# Patient Record
Sex: MALE | Race: WHITE | NOT HISPANIC OR LATINO | Employment: OTHER | URBAN - METROPOLITAN AREA
[De-identification: names, ages, dates, MRNs, and addresses within clinical notes are randomized per-mention and may not be internally consistent; named-entity substitution may affect disease eponyms.]

---

## 2018-04-08 ENCOUNTER — HOSPITAL ENCOUNTER (EMERGENCY)
Facility: HOSPITAL | Age: 58
Discharge: HOME | End: 2018-04-08
Attending: EMERGENCY MEDICINE | Admitting: EMERGENCY MEDICINE

## 2018-04-08 VITALS
SYSTOLIC BLOOD PRESSURE: 132 MMHG | OXYGEN SATURATION: 97 % | HEART RATE: 100 BPM | BODY MASS INDEX: 20.32 KG/M2 | HEIGHT: 72 IN | DIASTOLIC BLOOD PRESSURE: 80 MMHG | WEIGHT: 150 LBS | TEMPERATURE: 98.2 F | RESPIRATION RATE: 18 BRPM

## 2018-04-08 DIAGNOSIS — Z20.89 MENINGITIS EXPOSURE: Primary | ICD-10-CM

## 2018-04-08 PROCEDURE — 99282 EMERGENCY DEPT VISIT SF MDM: CPT | Performed by: PHYSICIAN ASSISTANT

## 2018-04-08 PROCEDURE — 99283 EMERGENCY DEPT VISIT LOW MDM: CPT

## 2018-04-08 RX ORDER — CIPROFLOXACIN 500 MG/1
500 TABLET, FILM COATED ORAL ONCE
Status: COMPLETED | OUTPATIENT
Start: 2018-04-08 | End: 2018-04-08

## 2018-04-08 RX ADMIN — CIPROFLOXACIN 500 MG: 500 TABLET, FILM COATED ORAL at 14:44

## 2018-04-08 NOTE — ED PROVIDER NOTES
Subjective   History of Present Illness  History of Present Illness    Chief complaint: Exposure to meningitis    Location: Home    Quality/Severity:  No symptoms.  0/10    Timing/Duration: Last contact was yesterday with  body    Modifying Factors: None    Associated Symptoms: Denies fevers or chills.  Denies headache.  Denies photophobia.  Denies neck stiffness.      Narrative: 58-year-old male presents after being exposed to meningitis with his brother.  His brother passed away yesterday unexpectedly.  He found him in the home.  He was told by the  that he should come in for further evaluation as he suspected bacterial meningitis.  He states that he is been seeing his brother for approximately the past few weeks, one time per week.  He did not know that he was sick until he found his body yesterday.  Patient has been completely asymptomatic other than his chronic emphysema.  No changes in patient's baseline.    Review of Systems  General: Denies fevers or chills.  Denies any weakness or fatigue.  Denies any weight loss or weight gain.  SKIN: Denies any rashes lesions or ulcers.  Denies color change.  ENT: Denies sore throat or rhinorrhea.  Denies ear pain.    EYES: Denies any blurred vision.  Denies any change in vision.  Denies any photophobia.  Denies any vision loss.  LUNGS: Denies any shortness of breath or wheezing.  Denies any cough.  Denies any hemoptysis.  CARDIAC: Denies any chest pain.  Denies palpitations.  Denies syncope.  Denies any edema  ABD: Denies any abdominal pain.  Denies any nausea or vomiting or diarrhea.  Denies any rectal bleeding.  Denies constipation  : Denies any dysuria, urgency, frequency or hematuria.  Denies discharge.  Denies flank pain.  NEURO: Denies any focal weakness.  Denies headache.  Denies seizures.  Denies changes in speech or difficulty walking.  ENDOCRINE: Denies polydipsia and polyuria  M/S: Denies arthralgias, back pain, myalgias or neck  pain  HEME/LYMPH: Negative for adenopathy. Does not bruise/bleed easily.   PSYCH: Negative for suicidal ideas. Denies anxiety or depression  review was performed in addition to those in the above all other reviews are negative.      Past Medical History:   Diagnosis Date   • Emphysema/COPD        No Known Allergies    Past Surgical History:   Procedure Laterality Date   • WRIST FUSION         History reviewed. No pertinent family history.    Social History     Social History   • Marital status:      Social History Main Topics   • Smoking status: Current Every Day Smoker     Packs/day: 1.00     Types: Cigarettes   • Smokeless tobacco: Never Used   • Alcohol use No   • Drug use: No     Other Topics Concern   • Not on file     Current Facility-Administered Medications:   •  ciprofloxacin (CIPRO) tablet 500 mg, 500 mg, Oral, Once, Rula Diaz PA-C    Current Outpatient Prescriptions:   •  budesonide-formoterol (SYMBICORT) 160-4.5 MCG/ACT inhaler, Inhale 2 puffs 2 (two) times a day., Disp: , Rfl:           Objective   Physical Exam  Vitals:    04/08/18 1425   BP: 132/80   Pulse: 100   Resp: 18   Temp: 98.2 °F (36.8 °C)   SpO2: 97%     GENERAL: a/o x 4, NAD  SKIN: Warm pink and dry   HEENT:  PERRLA, EOM intact, conjunctiva normal, sclera clear  NECK: supple, negative Kernig's and Brudzinski's.  No rigidity.  LUNGS: Clear to auscultation bilaterally without wheezes, rales or rhonchi.  No accessory muscle use and no nasal flaring.  CARDIAC:  Regular rate and rhythm, S1-S2.  No murmurs, rubs or gallops.  No peripheral edema.  Equal pulses bilaterally.  ABDOMEN: Soft, nontender, nondistended.  No guarding or rebound tenderness.  Normal bowel sounds.  MUSCULOSKELETAL: Moves all extremities well.  No deformity.  NEURO: Cranial nerves II through XII grossly intact.  No gross focal deficits.  Alert.  Normal speech and motor.  PSYCH: Normal mood and affect      Procedures         ED Course  ED Course      Educated  patient.  Cipro ×1 here for prophylaxis.  Offered blood work, the patient declined.  Explained to patient that CT scan and lumbar puncture with the standards to test for meningitis, but patient is asymptomatic at this time.  He understands the symptoms that would cause him to need to come back to the ER.    D/w Dr. Virgen.          MDM  Number of Diagnoses or Management Options  Meningitis exposure: new and does not require workup     Amount and/or Complexity of Data Reviewed  Tests in the medicine section of CPT®: ordered and reviewed    Risk of Complications, Morbidity, and/or Mortality  Presenting problems: low  Diagnostic procedures: low  Management options: low    Patient Progress  Patient progress: improved      Final diagnoses:   Meningitis exposure       Dictated utilizing Dragon dictation       Rula Diaz PA-C  04/08/18 2277

## 2018-04-08 NOTE — DISCHARGE INSTRUCTIONS
Return to the emergency department with developing symptoms, headache, uncontrolled pain, inability to tolerate oral liquids, fever  or as needed with emergent concerns.

## 2019-11-26 ENCOUNTER — OFFICE VISIT (OUTPATIENT)
Dept: NEUROSURGERY | Facility: CLINIC | Age: 59
End: 2019-11-26

## 2019-11-26 VITALS
BODY MASS INDEX: 20.05 KG/M2 | WEIGHT: 148 LBS | HEIGHT: 72 IN | HEART RATE: 83 BPM | DIASTOLIC BLOOD PRESSURE: 63 MMHG | SYSTOLIC BLOOD PRESSURE: 106 MMHG

## 2019-11-26 DIAGNOSIS — G89.29 CHRONIC MIDLINE LOW BACK PAIN WITHOUT SCIATICA: ICD-10-CM

## 2019-11-26 DIAGNOSIS — M19.022: ICD-10-CM

## 2019-11-26 DIAGNOSIS — M54.50 CHRONIC MIDLINE LOW BACK PAIN WITHOUT SCIATICA: ICD-10-CM

## 2019-11-26 DIAGNOSIS — M54.2 NECK PAIN: Primary | ICD-10-CM

## 2019-11-26 DIAGNOSIS — M50.30 DEGENERATIVE DISC DISEASE, CERVICAL: ICD-10-CM

## 2019-11-26 DIAGNOSIS — M51.36 DDD (DEGENERATIVE DISC DISEASE), LUMBAR: ICD-10-CM

## 2019-11-26 PROBLEM — M02.022: Status: ACTIVE | Noted: 2019-11-26

## 2019-11-26 PROCEDURE — 99204 OFFICE O/P NEW MOD 45 MIN: CPT | Performed by: NEUROLOGICAL SURGERY

## 2019-11-26 RX ORDER — ALBUTEROL SULFATE 90 UG/1
AEROSOL, METERED RESPIRATORY (INHALATION)
Refills: 2 | COMMUNITY
Start: 2019-10-15

## 2019-11-26 RX ORDER — TIOTROPIUM BROMIDE 18 UG/1
1 CAPSULE ORAL; RESPIRATORY (INHALATION) DAILY
Refills: 5 | COMMUNITY
Start: 2019-10-15

## 2019-11-26 RX ORDER — ALBUTEROL SULFATE 90 UG/1
2 AEROSOL, METERED RESPIRATORY (INHALATION)
COMMUNITY

## 2019-11-26 NOTE — PROGRESS NOTES
Subjective   History of Present Illness: Cleveland Ortega is a 59 y.o. male is here today as a self referral for neck pain that radiates into bilateral arms and lower back pain with numbness in bilateral legs. Patient denies any conservative treatment.  He saw Dr. Engle and neurology who did an EMG/NCV study that he said it was more painful than anything he is never had done.  Describes neck and low back pain over long period of time but pretty much the last year. He was evaluated by neurology for numbness and tingling in the hands and feet.  He states inexplicably over the last couple of weeks he has had a left elbow swelling.  Denies bowel or bladder symptoms.  Shall try to refer him to pain management and physical therapy but he was not going to do any treatment until he saw a spine surgeon.    Neck Pain    The current episode started more than 1 month ago. The problem has been gradually worsening. The pain is present in the left side and right side. The quality of the pain is described as burning. The symptoms are aggravated by position (lifting). Associated symptoms include headaches, numbness (bilateral legs) and weakness. Pertinent negatives include no chest pain.   Back Pain   The current episode started more than 1 month ago. The problem occurs intermittently. The problem has been gradually worsening since onset. The pain is present in the lumbar spine. The quality of the pain is described as shooting and cramping. The pain radiates to the right thigh, left thigh, left knee, left foot, right foot and right knee. The symptoms are aggravated by position. Associated symptoms include headaches, numbness (bilateral legs) and weakness. Pertinent negatives include no bladder incontinence, bowel incontinence or chest pain. He has tried nothing for the symptoms.       The following portions of the patient's history were reviewed and updated as appropriate: allergies, current medications, past family history, past  "medical history, past social history, past surgical history and problem list.    Review of Systems   Eyes: Negative.    Respiratory: Negative.  Negative for chest tightness and shortness of breath.    Cardiovascular: Negative.  Negative for chest pain.   Gastrointestinal: Negative.  Negative for bowel incontinence.   Endocrine: Negative.    Genitourinary: Negative.  Negative for bladder incontinence.   Musculoskeletal: Positive for back pain and neck pain.   Skin: Negative.    Allergic/Immunologic: Negative.    Neurological: Positive for weakness, numbness (bilateral legs) and headaches.   Hematological: Negative.    Psychiatric/Behavioral: Negative.    All other systems reviewed and are negative.      Objective     Vitals:    11/26/19 1337   BP: 106/63   Pulse: 83   Weight: 67.1 kg (148 lb)   Height: 182.9 cm (72\")     Body mass index is 20.07 kg/m².      Physical Exam  Neurologic Exam    Physical Exam:    CONSTITUTIONAL: This 59 year old right handed  male appears well developed, well-nourished and in no acute distress.    HEAD & FACE: the head and face are symmetric, normocephalic and atraumatic.    EYES: Inspection of the conjunctivae and lids reveals no swelling, erythema or discharge.  Pupils are round, equal and reactive to light and there is no scleral icterus.    EARS, NOSE, MOUTH & THROAT: On inspection, the ears and nose are within normal limits.    NECK: the neck is supple and symmetric. The trachea is midline with no masses.    PULMONARY: Respiratory effort is normal with no increased work of breathing or signs of respiratory distress.    CARDIOVASCULAR: Pedal pulses are +2/4 bilaterally. Examination of the extremities shows no edema or varicosities.    LYMPHATIC: There is no palpable lymphadenopathy of the neck.    MUSCULOSKELETAL: Gait and station are within normal limits. The spine has normal alignment and range of motion, helpful abnormality of the neck or low back.  He has some swelling of " the left elbow.    SKIN: The skin is warm, dry and intact    NEUROLOGIC:   Cranial Nerves 2-12 intact  Normal motor strength noted. Muscle bulk and tone are normal.  Sensory exam is normal to all modalities.  Reflexes on the right side demonstrates 1/4 Triceps Reflex, 1/4 Biceps Reflex, 1/4 Brachioradialis Reflex, 0/4 Knee Jerk Reflex, 0/4 Ankle Jerk Reflex and no ankle clonus on the right.   Reflexes on the left side demonstrates 1/4 Triceps Reflex, 1/4 Biceps Reflex, 1/4 Brachioradialis Reflex, 0/4 Knee Jerk Reflex, 0/4 Ankle Jerk Reflex and no ankle clonus on the left.  Superficial/Primitive Reflexes: primitive reflexes were absent.  No Ricci's, Babinski or clonus.  No coordination deficit observed.  Radicular testing showed a negative Levon (KASEY) test and negative straight leg raise.  Spurling's maneuver is negative.  Cortical function is intact and without deficits. Speech is normal.    PSYCHIATRIC: oriented to person, place and time. Patient's mood and affect are normal.    Assessment/Plan   Independent Review of Radiographic Studies:     EMG/NCV study done at [facility] on August 27, 2019 reveals   Moderate chronic inactive left L4 less than L5 radiculopathy.  There is mild inactive right C5 radiculopathy.  Mild right and minimal left median neuropathy distal to the wrist.    I personally reviewed the images from the following studies.    MRI of the lumbar spine done September 26, 2019 at [facility] and open MRI reveals multilevel bar degenerative change but only mild narrowing of the neuroforamina of every lumbar vertebral level no severe central or neuroforaminal stenosis at any level.  No disc herniation and only slight offset of L2 on L3.    MRI of the cervical spine done September 26, 2019 at [facility] and open MRI reveals multilevel cervical degenerative change.  Do not see any central stenosis to speak of and there is no cord compression.  There is some facet degenerative change with at  the most mild to moderate neuroforaminal narrowing at any level.  Most significant levels are at C3-C4 with left moderate to severe neuroforaminal narrowing and to the right at C5-C6 with moderate to severe neuroforaminal narrowing.    Medical Decision Making:      His clinical exam does not reveal any specific myelopathy or radiculopathy.  The MRI does not yield any severe spinal stenosis or specific threat to the neurologic elements.  He does have degenerative change and facet arthropathy with multiple levels of neuroforaminal stenosis but nothing that explains the findings on the EMG/NCV study.  In fact my review of the MRI looks fairly typical for his age group without surgical pathology.    The pattern of symptoms I think he needs a medical rheumatology opinion because of the multiple joint complaints, swellings and pains that he has.  He may be a good candidate for interventional pain management but I think a rheumatologic investigation needs to transpire.  I do not identify surgical pathology to consider neurosurgical follow-up.    He may want to consult with his primary care physician to start the rheumatologic work-up as the formal consultation may take several months.    Cleveland was seen today for neck pain.    Diagnoses and all orders for this visit:    Neck pain  -     Ambulatory Referral to Rheumatology    Chronic midline low back pain without sciatica  -     Ambulatory Referral to Rheumatology    DDD (degenerative disc disease), lumbar  -     Ambulatory Referral to Rheumatology    Degenerative disc disease, cervical  -     Ambulatory Referral to Rheumatology    Arthropathy of left elbow  -     Ambulatory Referral to Rheumatology      Return if symptoms worsen or fail to improve.           Darwin Kong MD FACS FAANS  Neurological Surgery

## 2021-09-14 ENCOUNTER — HOSPITAL ENCOUNTER (OUTPATIENT)
Dept: GENERAL RADIOLOGY | Facility: HOSPITAL | Age: 61
Discharge: HOME | End: 2021-09-14
Admitting: FAMILY MEDICINE

## 2021-09-14 ENCOUNTER — TRANSCRIBE ORDERS (OUTPATIENT)
Dept: ADMINISTRATIVE | Facility: HOSPITAL | Age: 61
End: 2021-09-14

## 2021-09-14 DIAGNOSIS — R06.00 DYSPNEA, UNSPECIFIED TYPE: Primary | ICD-10-CM

## 2021-09-14 DIAGNOSIS — R06.00 DYSPNEA, UNSPECIFIED TYPE: ICD-10-CM

## 2021-09-14 PROCEDURE — 71046 X-RAY EXAM CHEST 2 VIEWS: CPT

## 2023-05-03 ENCOUNTER — TRANSCRIBE ORDERS (OUTPATIENT)
Dept: ADMINISTRATIVE | Facility: HOSPITAL | Age: 63
End: 2023-05-03
Payer: MEDICARE

## 2023-05-03 DIAGNOSIS — R10.11 RIGHT UPPER QUADRANT PAIN: Primary | ICD-10-CM

## 2023-05-23 ENCOUNTER — HOSPITAL ENCOUNTER (OUTPATIENT)
Dept: ULTRASOUND IMAGING | Facility: HOSPITAL | Age: 63
Discharge: HOME | End: 2023-05-23
Admitting: FAMILY MEDICINE
Payer: MEDICARE

## 2023-05-23 DIAGNOSIS — R10.11 RIGHT UPPER QUADRANT PAIN: ICD-10-CM

## 2023-05-23 PROCEDURE — 76705 ECHO EXAM OF ABDOMEN: CPT

## 2024-05-12 ENCOUNTER — APPOINTMENT (OUTPATIENT)
Dept: GENERAL RADIOLOGY | Facility: HOSPITAL | Age: 64
End: 2024-05-12
Payer: MEDICARE

## 2024-05-12 ENCOUNTER — HOSPITAL ENCOUNTER (EMERGENCY)
Facility: HOSPITAL | Age: 64
Discharge: HOME | End: 2024-05-12
Attending: EMERGENCY MEDICINE | Admitting: EMERGENCY MEDICINE
Payer: MEDICARE

## 2024-05-12 VITALS
OXYGEN SATURATION: 97 % | HEART RATE: 68 BPM | TEMPERATURE: 98.2 F | BODY MASS INDEX: 20.76 KG/M2 | SYSTOLIC BLOOD PRESSURE: 106 MMHG | DIASTOLIC BLOOD PRESSURE: 71 MMHG | RESPIRATION RATE: 18 BRPM | WEIGHT: 145 LBS | HEIGHT: 70 IN

## 2024-05-12 DIAGNOSIS — M54.50 ACUTE MIDLINE LOW BACK PAIN WITHOUT SCIATICA: Primary | ICD-10-CM

## 2024-05-12 LAB
BILIRUB UR QL STRIP: NEGATIVE
CLARITY UR: CLEAR
COLOR UR: YELLOW
GLUCOSE UR STRIP-MCNC: NEGATIVE MG/DL
HGB UR QL STRIP.AUTO: NEGATIVE
KETONES UR QL STRIP: NEGATIVE
LEUKOCYTE ESTERASE UR QL STRIP.AUTO: NEGATIVE
NITRITE UR QL STRIP: NEGATIVE
PH UR STRIP.AUTO: 5.5 [PH] (ref 4.5–8)
PROT UR QL STRIP: NEGATIVE
SP GR UR STRIP: 1.03 (ref 1–1.03)
UROBILINOGEN UR QL STRIP: NORMAL

## 2024-05-12 PROCEDURE — 81003 URINALYSIS AUTO W/O SCOPE: CPT | Performed by: EMERGENCY MEDICINE

## 2024-05-12 PROCEDURE — 63710000001 PREDNISONE PER 1 MG: Performed by: EMERGENCY MEDICINE

## 2024-05-12 PROCEDURE — 96372 THER/PROPH/DIAG INJ SC/IM: CPT

## 2024-05-12 PROCEDURE — 25010000002 KETOROLAC TROMETHAMINE PER 15 MG: Performed by: EMERGENCY MEDICINE

## 2024-05-12 PROCEDURE — 99283 EMERGENCY DEPT VISIT LOW MDM: CPT

## 2024-05-12 PROCEDURE — 72100 X-RAY EXAM L-S SPINE 2/3 VWS: CPT

## 2024-05-12 RX ORDER — KETOROLAC TROMETHAMINE 30 MG/ML
30 INJECTION, SOLUTION INTRAMUSCULAR; INTRAVENOUS ONCE
Status: COMPLETED | OUTPATIENT
Start: 2024-05-12 | End: 2024-05-12

## 2024-05-12 RX ORDER — CYCLOBENZAPRINE HCL 10 MG
10 TABLET ORAL ONCE
Status: COMPLETED | OUTPATIENT
Start: 2024-05-12 | End: 2024-05-12

## 2024-05-12 RX ORDER — KETOROLAC TROMETHAMINE 10 MG/1
10 TABLET, FILM COATED ORAL EVERY 6 HOURS PRN
Qty: 20 TABLET | Refills: 0 | Status: SHIPPED | OUTPATIENT
Start: 2024-05-12

## 2024-05-12 RX ORDER — CYCLOBENZAPRINE HCL 10 MG
10 TABLET ORAL 3 TIMES DAILY PRN
Qty: 30 TABLET | Refills: 0 | Status: SHIPPED | OUTPATIENT
Start: 2024-05-12

## 2024-05-12 RX ORDER — PREDNISONE 20 MG/1
60 TABLET ORAL ONCE
Status: COMPLETED | OUTPATIENT
Start: 2024-05-12 | End: 2024-05-12

## 2024-05-12 RX ORDER — METHYLPREDNISOLONE 4 MG/1
TABLET ORAL
Qty: 21 TABLET | Refills: 0 | Status: SHIPPED | OUTPATIENT
Start: 2024-05-12

## 2024-05-12 RX ADMIN — CYCLOBENZAPRINE 10 MG: 10 TABLET, FILM COATED ORAL at 15:53

## 2024-05-12 RX ADMIN — KETOROLAC TROMETHAMINE 30 MG: 30 INJECTION, SOLUTION INTRAMUSCULAR; INTRAVENOUS at 15:52

## 2024-05-12 RX ADMIN — PREDNISONE 60 MG: 20 TABLET ORAL at 16:06

## 2024-05-12 NOTE — ED PROVIDER NOTES
Subjective   History of Present Illness    Chief complaint: Back pain    Location: Low back    Quality/Severity: Sharp    Timing/Onset/Duration: Few days ago    Modifying Factors: Hurts to move    Associated Symptoms: No headache.  No fever chills or cough.  No sore throat earache or nasal congestion.  No chest pain shortness of breath.  No abdominal pain.  No diarrhea or burning when he urinates.  No change in bladder or bowel function.    Narrative: This 64-year-old white male presents with low back pain for the last few days.  Patient states he was shoveling some dirt a few days ago.  He denies any numbness or tingling in his leg but sometimes they hurt.  No nausea vomiting or dysuria.  Patient does have a history of kidney stones but the presentation today is not typical of his usual kidney stones.  The patient states that been taking ibuprofen without relief.  He took someone else's gabapentin and this did not help.  The patient has no history of back surgery.    PCP:Zan Romano MD      PCP:Zan Romano MD      Review of Systems   Constitutional:  Negative for chills and fever.   HENT:  Negative for congestion, ear pain and sore throat.    Respiratory:  Negative for cough and shortness of breath.    Cardiovascular:  Negative for chest pain.   Gastrointestinal:  Negative for abdominal pain, diarrhea, nausea and vomiting.   Genitourinary:  Negative for difficulty urinating and dysuria.   Musculoskeletal:  Positive for back pain.   Skin:  Negative for wound.   Neurological:  Negative for headaches.   Psychiatric/Behavioral:  Negative for confusion.          Past Medical History:   Diagnosis Date    Emphysema/COPD        No Known Allergies    Past Surgical History:   Procedure Laterality Date    WRIST FUSION         History reviewed. No pertinent family history.    Social History     Socioeconomic History    Marital status:    Tobacco Use    Smoking status: Every Day     Current packs/day: 1.00      Types: Cigarettes    Smokeless tobacco: Never   Substance and Sexual Activity    Alcohol use: No    Drug use: No           Objective   Physical Exam  Vitals (The temperature is 98.2 °F, pulse 76, respirations 18, /96, room air pulse ox 98%) and nursing note reviewed.   Constitutional:       Appearance: Normal appearance.   HENT:      Head: Normocephalic and atraumatic.      Right Ear: Tympanic membrane normal.      Left Ear: Tympanic membrane normal.   Neck:      Comments: There is no tenderness, deformity, or bony step-offs upon palpation of cervical, thoracic, lumbar sacrococcygeal spine.  The patient does localize his pain to the midline lumbar region.  Cardiovascular:      Rate and Rhythm: Normal rate and regular rhythm.      Pulses: Normal pulses.      Heart sounds: Normal heart sounds. No murmur heard.     No gallop.   Pulmonary:      Effort: Pulmonary effort is normal.      Breath sounds: Normal breath sounds.   Abdominal:      General: Abdomen is flat. Bowel sounds are normal. There is no distension.      Palpations: Abdomen is soft. There is no mass.      Tenderness: There is no abdominal tenderness. There is no right CVA tenderness, left CVA tenderness, guarding or rebound.      Hernia: No hernia is present.   Musculoskeletal:         General: No swelling, tenderness, deformity or signs of injury. Normal range of motion.      Cervical back: Normal range of motion and neck supple. No tenderness.      Right lower leg: No edema.      Left lower leg: No edema.   Skin:     General: Skin is warm and dry.      Capillary Refill: Capillary refill takes less than 2 seconds.      Findings: No rash.   Neurological:      General: No focal deficit present.      Mental Status: He is alert and oriented to person, place, and time.         Procedures           ED Course  ED Course as of 05/12/24 1531   Sun May 12, 2024   1528 Urinalysis is unremarkable. [RC]      ED Course User Index  [RC] Tay Muller MD       17:37 EDT, 05/12/24:  The patient was reassessed.  He feels much better.  His vital signs reviewed and are stable    17:37 EDT, 05/12/24:  The patient's diagnosis of arthritic low back pain was discussed with him.  Patient will be placed on a Medrol Dosepak and a prescription for Flexeril.  The patient will be given a prescription for ketorolac.  The patient should follow-up with Dr. Romano in 1 week.  He should rest.  He should apply ice to his back for 20 minutes every 2-3 hours while awake for 2 to 3 days.  The patient should then apply moist heat for 20 minutes every 2 to 3 days while awake.  The patient should return to the emergency department if there is increased pain, numbness, tingling, weakness, change in bladder or bowel function, worse in any way at all.  All the patient's questions were answered he will be discharged in good condition                                       Medical Decision Making  Amount and/or Complexity of Data Reviewed  Radiology: ordered.    Risk  Prescription drug management.        Final diagnoses:   Acute midline low back pain without sciatica       ED Disposition  ED Disposition       None            No follow-up provider specified.       Medication List      No changes were made to your prescriptions during this visit.            Tay Muller MD  05/12/24 6748

## 2024-05-12 NOTE — DISCHARGE INSTRUCTIONS
Take the ketorolac as needed as directed for pain.  Take the Medrol Dosepak as prescribed.  Take Flexeril as needed as directed for pain.  Ice to low back for 20 minutes every 2-3 hours while awake for 2 to 3 days, then apply moist heat for 20 minutes every 2-3 hours while awake for 2 to 3 days.  Rest.  Follow-up with Dr. Saqib rodriguez and 1 week.  Return to the emergency department if there is increased pain, numbness, tingling, weakness, change in bladder or bowel function, worse in any way at all.

## 2024-08-28 ENCOUNTER — HOSPITAL ENCOUNTER (OUTPATIENT)
Dept: GENERAL RADIOLOGY | Facility: HOSPITAL | Age: 64
Discharge: HOME | End: 2024-08-28
Admitting: FAMILY MEDICINE
Payer: MEDICARE

## 2024-08-28 ENCOUNTER — TRANSCRIBE ORDERS (OUTPATIENT)
Dept: ADMINISTRATIVE | Facility: HOSPITAL | Age: 64
End: 2024-08-28
Payer: MEDICARE

## 2024-08-28 DIAGNOSIS — R06.02 SHORTNESS OF BREATH: ICD-10-CM

## 2024-08-28 DIAGNOSIS — R06.02 SHORTNESS OF BREATH: Primary | ICD-10-CM

## 2024-08-28 PROCEDURE — 71046 X-RAY EXAM CHEST 2 VIEWS: CPT
